# Patient Record
Sex: FEMALE | Race: WHITE | Employment: FULL TIME | URBAN - METROPOLITAN AREA
[De-identification: names, ages, dates, MRNs, and addresses within clinical notes are randomized per-mention and may not be internally consistent; named-entity substitution may affect disease eponyms.]

---
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
## 2021-02-02 PROBLEM — L60.0 ONYCHOCRYPTOSIS: Status: ACTIVE | Noted: 2021-02-02

## 2021-02-02 PROBLEM — L03.032 PARONYCHIA OF GREAT TOE, LEFT: Status: ACTIVE | Noted: 2021-02-02

## 2021-09-14 PROBLEM — M76.72 PERONEAL TENDINITIS OF LEFT LOWER LEG: Status: ACTIVE | Noted: 2021-09-14

## 2021-11-02 PROBLEM — L03.032 PARONYCHIA OF GREAT TOE, LEFT: Status: RESOLVED | Noted: 2021-02-02 | Resolved: 2021-11-02

## 2021-11-02 PROBLEM — L03.031 PARONYCHIA OF GREAT TOE, RIGHT: Status: RESOLVED | Noted: 2020-11-24 | Resolved: 2021-11-02

## 2023-11-03 ENCOUNTER — HOSPITAL ENCOUNTER (OUTPATIENT)
Age: 23
Discharge: HOME | End: 2023-11-03
Payer: OTHER MISCELLANEOUS

## 2023-11-03 VITALS
HEIGHT: 62 IN | DIASTOLIC BLOOD PRESSURE: 79 MMHG | HEART RATE: 87 BPM | TEMPERATURE: 98 F | OXYGEN SATURATION: 97 % | WEIGHT: 180 LBS | RESPIRATION RATE: 18 BRPM | BODY MASS INDEX: 33.13 KG/M2 | SYSTOLIC BLOOD PRESSURE: 118 MMHG

## 2023-11-03 DIAGNOSIS — T23.161A SUPERFICIAL BURN OF BACK OF RIGHT HAND, INITIAL ENCOUNTER: Primary | ICD-10-CM

## 2023-11-03 NOTE — ED INITIAL ASSESSMENT (HPI)
Pt malou at [facility] and burned right hand on spilled hot espresso

## 2023-11-03 NOTE — DISCHARGE INSTRUCTIONS
See attached information    Keep cool compresses applied for a few hours (do not submerge in water)  Apply Silvadene and non-stick gauze with ace wrap throughout the day (can reapply twice daily)  You may develop a blister, but DO NOT pop the blister as this can lead to infection (it may rupture on its own, but do your best not to pop it early)  Follow up with primary care provider as needed or if symptoms worsen